# Patient Record
Sex: FEMALE | Race: BLACK OR AFRICAN AMERICAN | URBAN - METROPOLITAN AREA
[De-identification: names, ages, dates, MRNs, and addresses within clinical notes are randomized per-mention and may not be internally consistent; named-entity substitution may affect disease eponyms.]

---

## 2023-06-15 ENCOUNTER — OFFICE (OUTPATIENT)
Dept: URBAN - METROPOLITAN AREA CLINIC 63 | Facility: CLINIC | Age: 23
Setting detail: OPHTHALMOLOGY
End: 2023-06-15
Payer: MEDICAID

## 2023-06-15 DIAGNOSIS — H01.005: ICD-10-CM

## 2023-06-15 DIAGNOSIS — H01.002: ICD-10-CM

## 2023-06-15 DIAGNOSIS — H52.13: ICD-10-CM

## 2023-06-15 PROCEDURE — 92004 COMPRE OPH EXAM NEW PT 1/>: CPT | Performed by: SPECIALIST

## 2023-06-15 PROCEDURE — 92015 DETERMINE REFRACTIVE STATE: CPT | Performed by: SPECIALIST

## 2023-06-15 ASSESSMENT — SPHEQUIV_DERIVED
OD_SPHEQUIV: -0.375
OS_SPHEQUIV: -0.625

## 2023-06-15 ASSESSMENT — CONFRONTATIONAL VISUAL FIELD TEST (CVF)
OS_FINDINGS: FULL
OD_FINDINGS: FULL

## 2023-06-15 ASSESSMENT — REFRACTION_MANIFEST
OD_CYLINDER: SPHERE
OD_SPHERE: -0.50
OS_VA1: 20/20
OD_VA1: 20/20
OS_CYLINDER: SPHERE
OS_SPHERE: -0.50

## 2023-06-15 ASSESSMENT — REFRACTION_AUTOREFRACTION
OD_CYLINDER: -0.25
OD_SPHERE: -0.25
OS_AXIS: 80
OS_SPHERE: -0.50
OD_AXIS: 45
OS_CYLINDER: -0.25

## 2023-06-15 ASSESSMENT — TONOMETRY
OD_IOP_MMHG: 16
OS_IOP_MMHG: 16

## 2023-06-15 ASSESSMENT — LID EXAM ASSESSMENTS
OD_BLEPHARITIS: RLL 1+
OS_BLEPHARITIS: LLL 1+

## 2023-06-15 ASSESSMENT — VISUAL ACUITY
OS_BCVA: 20/25
OD_BCVA: 20/25

## 2024-05-20 ENCOUNTER — NON-APPOINTMENT (OUTPATIENT)
Age: 24
End: 2024-05-20

## 2024-05-20 ENCOUNTER — APPOINTMENT (OUTPATIENT)
Dept: OBGYN | Facility: CLINIC | Age: 24
End: 2024-05-20
Payer: COMMERCIAL

## 2024-05-20 ENCOUNTER — TRANSCRIPTION ENCOUNTER (OUTPATIENT)
Age: 24
End: 2024-05-20

## 2024-05-20 VITALS
WEIGHT: 227.2 LBS | HEIGHT: 67 IN | DIASTOLIC BLOOD PRESSURE: 70 MMHG | BODY MASS INDEX: 35.66 KG/M2 | SYSTOLIC BLOOD PRESSURE: 110 MMHG

## 2024-05-20 DIAGNOSIS — Z00.00 ENCOUNTER FOR GENERAL ADULT MEDICAL EXAMINATION W/OUT ABNORMAL FINDINGS: ICD-10-CM

## 2024-05-20 PROCEDURE — 99385 PREV VISIT NEW AGE 18-39: CPT

## 2024-05-20 NOTE — PHYSICAL EXAM
[Alert] : alert [No Lymphadenopathy] : no lymphadenopathy [Soft] : soft [Non-tender] : non-tender [Non-distended] : non-distended [No HSM] : No HSM [No Mass] : no mass [FreeTextEntry6] : Soft, fibrocystic and glandular.  Nontender.  No predominant nodules or lymphadenopathy [Labia Majora] : normal [Labia Minora] : normal [Normal] : normal [Uterine Adnexae] : normal

## 2024-05-20 NOTE — PLAN
[FreeTextEntry1] : Wellness exam.  Normal physical examination.  Recommendations: Condom use for STD prevention.  Gardasil vaccination.  The patient declines contraception at this time.  Reviewed options for contraception.  The risk and benefits for oral contraceptive pills were reviewed, especially in the context of  her BRCA1 mutation.   BRCA1 heterozygous mutation.  The patient is status post genetic counseling.  Benefits of taking an oral contraceptive pill were reviewed.  Will obtain blood work including , and pelvic ultrasound

## 2024-05-20 NOTE — HISTORY OF PRESENT ILLNESS
[TextBox_4] : 24-year-old  0 LMP May 8, 2024 presents for an annual examination.  Review of systems are negative

## 2024-05-21 ENCOUNTER — NON-APPOINTMENT (OUTPATIENT)
Age: 24
End: 2024-05-21

## 2024-05-21 DIAGNOSIS — Z80.3 FAMILY HISTORY OF MALIGNANT NEOPLASM OF BREAST: ICD-10-CM

## 2024-05-21 DIAGNOSIS — Z78.9 OTHER SPECIFIED HEALTH STATUS: ICD-10-CM

## 2024-05-21 LAB
C TRACH RRNA SPEC QL NAA+PROBE: NOT DETECTED
N GONORRHOEA RRNA SPEC QL NAA+PROBE: NOT DETECTED
SOURCE TP AMPLIFICATION: NORMAL

## 2024-05-24 LAB — CYTOLOGY CVX/VAG DOC THIN PREP: NORMAL

## 2024-05-31 ENCOUNTER — APPOINTMENT (OUTPATIENT)
Dept: OBGYN | Facility: CLINIC | Age: 24
End: 2024-05-31
Payer: COMMERCIAL

## 2024-05-31 VITALS
SYSTOLIC BLOOD PRESSURE: 110 MMHG | HEIGHT: 67 IN | DIASTOLIC BLOOD PRESSURE: 70 MMHG | WEIGHT: 227 LBS | BODY MASS INDEX: 35.63 KG/M2

## 2024-05-31 DIAGNOSIS — Z15.09 GENETIC SUSCEPTIBILITY TO MALIGNANT NEOPLASM OF BREAST: ICD-10-CM

## 2024-05-31 DIAGNOSIS — Z15.02 GENETIC SUSCEPTIBILITY TO MALIGNANT NEOPLASM OF BREAST: ICD-10-CM

## 2024-05-31 DIAGNOSIS — Z15.01 GENETIC SUSCEPTIBILITY TO MALIGNANT NEOPLASM OF BREAST: ICD-10-CM

## 2024-05-31 LAB
HCG UR QL: NEGATIVE
QUALITY CONTROL: YES

## 2024-05-31 PROCEDURE — 99214 OFFICE O/P EST MOD 30 MIN: CPT | Mod: 25

## 2024-05-31 PROCEDURE — 81025 URINE PREGNANCY TEST: CPT

## 2024-05-31 RX ORDER — NORETHINDRONE ACETATE AND ETHINYL ESTRADIOL AND FERROUS FUMARATE 1MG-20(21)
1-20 KIT ORAL DAILY
Qty: 84 | Refills: 1 | Status: ACTIVE | COMMUNITY
Start: 2024-05-31 | End: 1900-01-01

## 2024-06-03 PROBLEM — Z15.01 BRCA1 GENE MUTATION POSITIVE IN FEMALE: Status: ACTIVE | Noted: 2024-05-20

## 2024-06-03 RX ORDER — NORETHINDRONE ACETATE AND ETHINYL ESTRADIOL AND FERROUS FUMARATE 1.5-30(21)
1.5-3 KIT ORAL DAILY
Qty: 84 | Refills: 1 | Status: DISCONTINUED | COMMUNITY
Start: 2024-06-03 | End: 2024-06-03

## 2024-06-03 NOTE — DISCUSSION/SUMMARY
[FreeTextEntry1] : Discussion:  The patient previously underwent genetic counseling. Nonetheless, recommendations for a modified cancer screening were reviewed. The patient was advised that her risk for breast cancer is increased. Her lifetime risk is of breast cancer is between 57%-72% Breast cancer surveillance was recommended to include annual breast MRIs, starting at age 26 yo,, and annual mammograms at age 30. Monthly self breast examinations were also recommended.  Moreover, her risk for ovarian cancer is increased. Lifetime risk of ovarian cancer is between 39%-59%The patient is desirous of contraception. Oral contraceptive pills were recommended due to the reduced risk of ovarian cancer associated with OCP use. In fact, the protective effect of OCPs increases with longer duration of use. Theoretically, although some studies have suggested an increased risk of breast cancer with OCP use, this has not been clearly supported in the literature. Moreover, annual CA-125 levels and pelvic sonograms were recommended to screen for ovarian cancer.  The plan is to prescribe junel Fe 1/20. Common side effects for new starters of OCPs were discussed.  Lastly, there is no consensus regarding screening for melanoma and pancreatic cancer. The patient does not have a family history of pancreatic cancer or melanoma. Therefore, annual dermatological examinations were recommended at this time.  Total time of the consultation, which was completely devoted to cancer surveillance due to a history of the BRCA1 mutation, was 30 minutes. All of the patient's questions were answered to her satisfaction.

## 2024-06-03 NOTE — REASON FOR VISIT
[Follow-Up] : a follow-up evaluation of [FreeTextEntry2] : pregnancy test and bc discussion if pregnancy test is negative

## 2024-06-03 NOTE — HISTORY OF PRESENT ILLNESS
[TextBox_4] : 23 yo G0 LMP May 8, 2024 with a history of BRCA1 heterozygous mutation presents to discuss cancer surveillance and contraception management.

## 2024-06-04 RX ORDER — NORETHINDRONE ACETATE AND ETHINYL ESTRADIOL AND FERROUS FUMARATE 1MG-20(21)
1-20 KIT ORAL DAILY
Qty: 84 | Refills: 1 | Status: ACTIVE | COMMUNITY
Start: 2024-06-04 | End: 1900-01-01

## 2024-11-06 ENCOUNTER — RX RENEWAL (OUTPATIENT)
Age: 24
End: 2024-11-06

## 2024-11-06 RX ORDER — NORETHINDRONE ACETATE AND ETHINYL ESTRADIOL AND FERROUS FUMARATE 1.5-30(21)
1.5-3 KIT ORAL
Qty: 84 | Refills: 1 | Status: ACTIVE | COMMUNITY
Start: 2024-11-06 | End: 1900-01-01